# Patient Record
Sex: FEMALE | Race: WHITE
[De-identification: names, ages, dates, MRNs, and addresses within clinical notes are randomized per-mention and may not be internally consistent; named-entity substitution may affect disease eponyms.]

---

## 2019-09-12 ENCOUNTER — HOSPITAL ENCOUNTER (EMERGENCY)
Dept: HOSPITAL 76 - ED | Age: 24
Discharge: HOME | End: 2019-09-12
Payer: COMMERCIAL

## 2019-09-12 VITALS — SYSTOLIC BLOOD PRESSURE: 119 MMHG | DIASTOLIC BLOOD PRESSURE: 82 MMHG

## 2019-09-12 DIAGNOSIS — L02.31: Primary | ICD-10-CM

## 2019-09-12 PROCEDURE — 99282 EMERGENCY DEPT VISIT SF MDM: CPT

## 2019-09-12 PROCEDURE — 99283 EMERGENCY DEPT VISIT LOW MDM: CPT

## 2019-09-12 NOTE — ED PHYSICIAN DOCUMENTATION
PD HPI SKIN





- Stated complaint


Stated Complaint: FEM /LUMP ON SKIN/PX





- Chief complaint


Chief Complaint: Wound





- History obtained from


History obtained from: Patient





- History of Present Illness


Timing - onset: How many days ago (8)


Timing - details: Gradual onset


Location: Other (right buttock)


Quality / character: Painful, Swelling


Associated symptoms: No: Fever


Recently seen: Not recently seen





Review of Systems


Constitutional: denies: Fever, Chills, Sweats


Skin: reports: Lesions





PD PAST MEDICAL HISTORY





- Past Medical History


Past Medical History: Yes


Psych: Depression





- Past Surgical History


Past Surgical History: Yes


/GYN: Other





- Present Medications


Home Medications: 


                                Ambulatory Orders











 Medication  Instructions  Recorded  Confirmed


 


Doxycycline Hyclate [Vibramycin] 100 mg PO BID #19 capsule 09/12/19 


 


FLUoxetine [PROzac] 20 mg PO DAILY 09/12/19 09/12/19


 


Hydrocodone/Acetaminophen 1 - 2 each PO Q6HR PRN #14 tablet 09/12/19 





[Hydrocodone-Acetamin 5-325 mg]   














- Allergies


Allergies/Adverse Reactions: 


                                    Allergies











Allergy/AdvReac Type Severity Reaction Status Date / Time


 


No Known Drug Allergies Allergy   Verified 09/12/19 23:24














- Social History


Does the pt smoke?: No


Smoking Status: Never smoker





PD ED PE NORMAL





- Vitals


Vital signs reviewed: Yes





- General


General: Alert and oriented X 3, No acute distress, Well developed/nourished





PD ED PE EXPANDED





- Female 


Female  visual: 


                            __________________________














                            __________________________





 1 - swelling, tenderness (tender, erythematous swelling without discharge, 

fluctuance, or palpable margins to suggest abscess)








Results





- Vitals


Vitals: 


                               Vital Signs - 24 hr











  09/12/19





  23:18


 


Temperature 37.1 C


 


Heart Rate 93


 


Blood Pressure 119/82 H


 


O2 Saturation 99








                                     Oxygen











O2 Source                      Room air

















PD MEDICAL DECISION MAKING





- ED course


Complexity details: considered differential, d/w patient





Departure





- Departure


Disposition: 01 Home, Self Care


Clinical Impression: 


 Abscess





Condition: Good


Instructions:  ED Staph Infec Abx Tx Only


Follow-Up: 


Errol Rodriguez MD [Primary Care Provider] - Within 3 Days


Prescriptions: 


Hydrocodone/Acetaminophen [Hydrocodone-Acetamin 5-325 mg] 1 - 2 each PO Q6HR PRN

#14 tablet


 PRN Reason: Pain


Doxycycline Hyclate [Vibramycin] 100 mg PO BID #19 capsule


Discharge Date/Time: 09/12/19 23:58

## 2020-01-19 ENCOUNTER — HOSPITAL ENCOUNTER (EMERGENCY)
Dept: HOSPITAL 76 - ED | Age: 25
Discharge: HOME | End: 2020-01-19
Payer: COMMERCIAL

## 2020-01-19 VITALS — SYSTOLIC BLOOD PRESSURE: 130 MMHG | DIASTOLIC BLOOD PRESSURE: 64 MMHG

## 2020-01-19 DIAGNOSIS — N39.0: Primary | ICD-10-CM

## 2020-01-19 LAB
CLARITY UR REFRACT.AUTO: CLEAR
GLUCOSE UR QL STRIP.AUTO: NEGATIVE MG/DL
HCG UR QL: NEGATIVE
KETONES UR QL STRIP.AUTO: NEGATIVE MG/DL
NITRITE UR QL STRIP.AUTO: NEGATIVE
PH UR STRIP.AUTO: 7 PH (ref 5–7.5)
PROT UR STRIP.AUTO-MCNC: NEGATIVE MG/DL
RBC # UR STRIP.AUTO: NEGATIVE /UL
SP GR UR STRIP.AUTO: 1.01 (ref 1–1.03)
SP GR UR STRIP.AUTO: 1.01 (ref 1–1.03)
SQUAMOUS URNS QL MICRO: (no result)
UROBILINOGEN UR QL STRIP.AUTO: (no result) E.U./DL
UROBILINOGEN UR STRIP.AUTO-MCNC: NEGATIVE MG/DL

## 2020-01-19 PROCEDURE — 87086 URINE CULTURE/COLONY COUNT: CPT

## 2020-01-19 PROCEDURE — 81025 URINE PREGNANCY TEST: CPT

## 2020-01-19 PROCEDURE — 81001 URINALYSIS AUTO W/SCOPE: CPT

## 2020-01-19 PROCEDURE — 99283 EMERGENCY DEPT VISIT LOW MDM: CPT

## 2020-01-19 PROCEDURE — 81003 URINALYSIS AUTO W/O SCOPE: CPT

## 2020-01-19 NOTE — ED PHYSICIAN DOCUMENTATION
PD HPI FEMALE 





- Stated complaint


Stated Complaint: FEMALE 





- Chief complaint


Chief Complaint: UTI





- History obtained from


History obtained from: Patient





- History of Present Illness


Timing - onset: How many days ago (2-3)


Timing - details: Gradual onset


Associated symptoms: Back pain, Vaginal discharge, Dysuria, Urinary frequency, 

Hematuria.  No: Fever, Abdominal pain, Pelvic pain, Vaginal pain, Vaginal 

bleeding, Genital sore/lesion


Contributing factors: No: Pregnant


Recently seen: Not recently seen





- Additional information


Additional information: 





This is a 24-year-old woman who presents with complaints that she thought she 

had a yeast infection because she was itchy and had a vaginal discharge about 6 

days ago.  She was trying to get into see her primary care provider but could 

not get an appointment and then she started developing increased urination 

frequency and low back pain in the right side that is now radiating to the left 

side and "throbbing".  She seen a little bit of light pink in the urine which 

could be blood.  Her last menstrual period was a couple of weeks ago and she 

denies pregnancy stating that she has an IUD.  She denies concern for STDs such 

as gonorrhea or chlamydia and declines testing for those infections.  She is b

een a little bit nauseous but no vomiting.  Denies fever, cough or sore throat.





Review of Systems


Constitutional: denies: Fever


Throat: denies: Sore throat


Respiratory: denies: Cough


GI: reports: Nausea.  denies: Abdominal Pain, Vomiting


: reports: Dysuria, Frequency, Hematuria, Discharge, LMP (2 weeks ago).  

denies: Incontinent, Now pregnant EGA


Musculoskeletal: reports: Back pain





PD PAST MEDICAL HISTORY





- Past Medical History


Psych: Depression





- Past Surgical History


Past Surgical History: Yes


/GYN: Other





- Present Medications


Home Medications: 


                                Ambulatory Orders











 Medication  Instructions  Recorded  Confirmed


 


Doxycycline Hyclate [Vibramycin] 100 mg PO BID #19 capsule 09/12/19 


 


FLUoxetine [PROzac] 20 mg PO DAILY 09/12/19 09/12/19


 


Hydrocodone/Acetaminophen 1 - 2 each PO Q6HR PRN #14 tablet 09/12/19 





[Hydrocodone-Acetamin 5-325 mg]   


 


Nitrofurantoin Monohyd/M-Cryst 100 mg PO BID #20 capsule 01/19/20 





[Macrobid 100 mg Capsule]   














- Allergies


Allergies/Adverse Reactions: 


                                    Allergies











Allergy/AdvReac Type Severity Reaction Status Date / Time


 


No Known Drug Allergies Allergy   Verified 01/19/20 13:10














- Social History


Does the pt smoke?: No


Smoking Status: Never smoker





PD ED PE NORMAL





- Vitals


Vital signs reviewed: Yes





- General


General: Alert and oriented X 3, No acute distress, Well developed/nourished, 

Other (Obese 24-year-old.)





- HEENT


HEENT: Atraumatic, Other (No scleral icterus)





- Abdomen


Abdomen: Normal bowel sounds, Soft, Non tender





- Back


Back: No CVA TTP





- Derm


Derm: Normal color, Warm and dry, No rash





- Psych


Psych: Normal mood, Normal affect





Results





- Vitals


Vitals: 


                               Vital Signs - 24 hr











  01/19/20 01/19/20





  13:10 15:23


 


Temperature 36.9 C 


 


Heart Rate 86 73


 


Respiratory 17 18





Rate  


 


Blood Pressure 124/57 L 130/64


 


O2 Saturation 99 99








                                     Oxygen











O2 Source                      Room air

















- Labs


Labs: 


                                Laboratory Tests











  01/19/20 01/19/20





  13:26 13:26


 


Urine Color  YELLOW 


 


Urine Clarity  CLEAR 


 


Urine pH  7.0 


 


Ur Specific Gravity  1.015  1.015


 


Urine Protein  NEGATIVE 


 


Urine Glucose (UA)  NEGATIVE 


 


Urine Ketones  NEGATIVE 


 


Urine Occult Blood  NEGATIVE 


 


Urine Nitrite  NEGATIVE 


 


Urine Bilirubin  NEGATIVE 


 


Urine Urobilinogen  0.2 (NORMAL) 


 


Ur Leukocyte Esterase  MODERATE H 


 


Urine RBC  None Seen 


 


Urine WBC  11-25 H 


 


Ur Squamous Epith Cells  MOD Squamous H 


 


Urine Bacteria  Few 


 


Ur Microscopic Review  INDICATED 


 


Urine Culture Comments  NOT INDICATED 


 


Urine HCG, Qual   NEGATIVE














PD MEDICAL DECISION MAKING





- ED course


Complexity details: reviewed results, d/w patient


ED course: 





The urinalysis was contaminated with squamous epithelial cells but there were 

11-25 white blood cells per high-power field.  She was not pregnant.  Patient 

will be treated empirically with Macrobid twice daily for 10 days.  Encouraged 

to drink lots of water and take ibuprofen if needed for pain.  She should 

follow-up with her primary care provider to have the urine retested after 

finishing the antibiotics.  Return if she has worsening back pain, fever, 

vomiting or other problems arise.  She was offered gonorrhea and Chlamydia 

testing today which she has declined.





Departure





- Departure


Disposition: 01 Home, Self Care


Clinical Impression: 


 Urinary tract infection





Condition: Good


Instructions:  ED UTI Cystitis Female


Follow-Up: 


Errol Rodriguez MD [Primary Care Provider] - 


Prescriptions: 


Nitrofurantoin Monohyd/M-Cryst [Macrobid 100 mg Capsule] 100 mg PO BID #20 

capsule


Comments: 


Drink lots of water.  Take ibuprofen if needed for pain.  Start the antibiotic 

today and take it twice a day for 10 days.  Follow-up with your primary care 

provider to make sure that the infection has cleared and retest the urine after 

finishing the antibiotics.  Follow-up sooner if you have increasing back pain, 

vomiting, fever or other problems arise.


Discharge Date/Time: 01/19/20 15:23

## 2020-03-08 ENCOUNTER — HOSPITAL ENCOUNTER (EMERGENCY)
Dept: HOSPITAL 76 - ED | Age: 25
Discharge: HOME | End: 2020-03-08
Payer: COMMERCIAL

## 2020-03-08 VITALS — DIASTOLIC BLOOD PRESSURE: 67 MMHG | SYSTOLIC BLOOD PRESSURE: 133 MMHG

## 2020-03-08 DIAGNOSIS — G43.001: Primary | ICD-10-CM

## 2020-03-08 PROCEDURE — 96374 THER/PROPH/DIAG INJ IV PUSH: CPT

## 2020-03-08 PROCEDURE — 99284 EMERGENCY DEPT VISIT MOD MDM: CPT

## 2020-03-08 PROCEDURE — 96375 TX/PRO/DX INJ NEW DRUG ADDON: CPT

## 2020-03-08 PROCEDURE — 96361 HYDRATE IV INFUSION ADD-ON: CPT

## 2020-03-08 PROCEDURE — 99283 EMERGENCY DEPT VISIT LOW MDM: CPT

## 2020-11-21 ENCOUNTER — HOSPITAL ENCOUNTER (EMERGENCY)
Dept: HOSPITAL 76 - ED | Age: 25
Discharge: HOME | End: 2020-11-21
Payer: COMMERCIAL

## 2020-11-21 VITALS — DIASTOLIC BLOOD PRESSURE: 60 MMHG | SYSTOLIC BLOOD PRESSURE: 119 MMHG

## 2020-11-21 DIAGNOSIS — R10.11: Primary | ICD-10-CM

## 2020-11-21 LAB
ALBUMIN DIAFP-MCNC: 4.3 G/DL (ref 3.2–5.5)
ALBUMIN/GLOB SERPL: 1.3 {RATIO} (ref 1–2.2)
ALP SERPL-CCNC: 44 IU/L (ref 42–121)
ALT SERPL W P-5'-P-CCNC: 17 IU/L (ref 10–60)
ANION GAP SERPL CALCULATED.4IONS-SCNC: 8 MMOL/L (ref 6–13)
AST SERPL W P-5'-P-CCNC: 15 IU/L (ref 10–42)
BASOPHILS NFR BLD AUTO: 0 10^3/UL (ref 0–0.1)
BASOPHILS NFR BLD AUTO: 0.3 %
BILIRUB BLD-MCNC: 0.6 MG/DL (ref 0.2–1)
BUN SERPL-MCNC: 16 MG/DL (ref 6–20)
CALCIUM UR-MCNC: 9.5 MG/DL (ref 8.5–10.3)
CHLORIDE SERPL-SCNC: 104 MMOL/L (ref 101–111)
CLARITY UR REFRACT.AUTO: (no result)
CO2 SERPL-SCNC: 26 MMOL/L (ref 21–32)
CREAT SERPLBLD-SCNC: 0.8 MG/DL (ref 0.4–1)
EOSINOPHIL # BLD AUTO: 0.1 10^3/UL (ref 0–0.7)
EOSINOPHIL NFR BLD AUTO: 2.1 %
ERYTHROCYTE [DISTWIDTH] IN BLOOD BY AUTOMATED COUNT: 14.4 % (ref 12–15)
GLOBULIN SER-MCNC: 3.2 G/DL (ref 2.1–4.2)
GLUCOSE SERPL-MCNC: 97 MG/DL (ref 70–100)
GLUCOSE UR QL STRIP.AUTO: NEGATIVE MG/DL
HCG UR QL: NEGATIVE
HGB UR QL STRIP: 12.8 G/DL (ref 12–16)
KETONES UR QL STRIP.AUTO: 15 MG/DL
LIPASE SERPL-CCNC: 44 U/L (ref 22–51)
LYMPHOCYTES # SPEC AUTO: 2 10^3/UL (ref 1.5–3.5)
LYMPHOCYTES NFR BLD AUTO: 33.9 %
MCH RBC QN AUTO: 29.2 PG (ref 27–31)
MCHC RBC AUTO-ENTMCNC: 32.9 G/DL (ref 32–36)
MCV RBC AUTO: 88.6 FL (ref 81–99)
MONOCYTES # BLD AUTO: 0.4 10^3/UL (ref 0–1)
MONOCYTES NFR BLD AUTO: 6.9 %
NEUTROPHILS # BLD AUTO: 3.3 10^3/UL (ref 1.5–6.6)
NEUTROPHILS # SNV AUTO: 5.8 X10^3/UL (ref 4.8–10.8)
NEUTROPHILS NFR BLD AUTO: 56.6 %
NITRITE UR QL STRIP.AUTO: NEGATIVE
PDW BLD AUTO: 10.2 FL (ref 7.9–10.8)
PH UR STRIP.AUTO: 6.5 PH (ref 5–7.5)
PLATELET # BLD: 290 10^3/UL (ref 130–450)
PROT SPEC-MCNC: 7.5 G/DL (ref 6.7–8.2)
PROT UR STRIP.AUTO-MCNC: NEGATIVE MG/DL
RBC # UR STRIP.AUTO: NEGATIVE /UL
RBC MAR: 4.39 10^6/UL (ref 4.2–5.4)
SODIUM SERPLBLD-SCNC: 138 MMOL/L (ref 135–145)
SP GR UR STRIP.AUTO: 1.02 (ref 1–1.03)
SQUAMOUS URNS QL MICRO: (no result)
UROBILINOGEN UR QL STRIP.AUTO: (no result) E.U./DL
UROBILINOGEN UR STRIP.AUTO-MCNC: (no result) MG/DL

## 2020-11-21 PROCEDURE — 96376 TX/PRO/DX INJ SAME DRUG ADON: CPT

## 2020-11-21 PROCEDURE — 96374 THER/PROPH/DIAG INJ IV PUSH: CPT

## 2020-11-21 PROCEDURE — 36415 COLL VENOUS BLD VENIPUNCTURE: CPT

## 2020-11-21 PROCEDURE — 99285 EMERGENCY DEPT VISIT HI MDM: CPT

## 2020-11-21 PROCEDURE — 85025 COMPLETE CBC W/AUTO DIFF WBC: CPT

## 2020-11-21 PROCEDURE — 99284 EMERGENCY DEPT VISIT MOD MDM: CPT

## 2020-11-21 PROCEDURE — 81003 URINALYSIS AUTO W/O SCOPE: CPT

## 2020-11-21 PROCEDURE — 81025 URINE PREGNANCY TEST: CPT

## 2020-11-21 PROCEDURE — 83690 ASSAY OF LIPASE: CPT

## 2020-11-21 PROCEDURE — 76705 ECHO EXAM OF ABDOMEN: CPT

## 2020-11-21 PROCEDURE — 80053 COMPREHEN METABOLIC PANEL: CPT

## 2020-11-21 PROCEDURE — 96375 TX/PRO/DX INJ NEW DRUG ADDON: CPT

## 2020-11-21 PROCEDURE — 87086 URINE CULTURE/COLONY COUNT: CPT

## 2020-11-21 PROCEDURE — 74177 CT ABD & PELVIS W/CONTRAST: CPT

## 2020-11-21 PROCEDURE — 71275 CT ANGIOGRAPHY CHEST: CPT

## 2020-11-21 PROCEDURE — 81001 URINALYSIS AUTO W/SCOPE: CPT

## 2020-11-21 NOTE — CT REPORT
PROCEDURE:  ANGIO CHEST W/WO

 

INDICATIONS:  Right-sided chest pain

 

CONTRAST:  IV CONTRAST: Optiray 320 ml: 100 PO CONTRAST: *NO PO CONTRAST 

 

TECHNIQUE:  

After the administration of intravenous contrast, 2 mm thick sections acquired from the pulmonary api
letty to the posterior costophrenic angles.  3-dimensional maximum intensity projection (MIP) coronal a
nd sagittal reformats were then acquired through the thorax. For radiation dose reduction, the follow
ing was used:  automated exposure control, adjustment of mA and/or kV according to patient size. 

 

COMPARISON:  None

 

FINDINGS:  

Image quality:  Excellent.  

 

Pulmonary arteries:  Pulmonary arteries are normal in size, and demonstrate no intraluminal filling d
efects to suggest central pulmonary embolism.  

 

Lungs and pleura:  Lungs are clear.  No pleural effusions or pneumothorax.  Central and peripheral ai
rways are patent.  

 

Mediastinum:  Heart size is normal, without pericardial effusion.  No mediastinal or hilar adenopathy
.  Thoracic aorta is normal in caliber and enhancement.  Esophagus is normal in caliber, without hiat
al hernia.  

 

Bones and chest wall:  No suspicious bony lesions.  Ribs and thoracic spine appear intact throughout.
  The thyroid is normal.  No axillary or supraclavicular adenopathy.  

 

Abdomen:  Visualized upper abdominal solid organs appear normal in the early arterial phase of enhanc
ement.  

 

IMPRESSION:  

No acute finding.

 

Reviewed by: Ulises Cornelius MD on 11/21/2020 3:20 PM PST

Approved by: Ulises Cornelius MD on 11/21/2020 3:20 PM PST

 

 

Station ID:  [id number]

## 2020-11-21 NOTE — CT REPORT
PROCEDURE:  Abdomen/Pelvis W

 

INDICATIONS:  IV only, RUQ pain, neg sono

 

CONTRAST:  IV CONTRAST: Optiray 320 ml: 100 PO CONTRAST: *NO PO CONTRAST 

 

TECHNIQUE:  

After the administration of intravenous contrast, 5 mm thick sections acquired from the diaphragms to
 the symphysis.  5 mm thick coronal and sagittal reformats were acquired.  For radiation dose reducti
on, the following was used:  automated exposure control, adjustment of mA and/or kV according to jennyfer
ent size.  

 

COMPARISON:  None.

 

FINDINGS:  

Image quality:  Excellent.  

 

ABDOMEN:  

Lung bases:  Lung bases are clear.  Heart size is normal.  

 

Solid organs:  Liver and spleen are within normal limits. Gallbladder is normal.  Biliary system is n
on dilated.  Pancreas enhances normally.  No adrenal nodules.  Kidneys demonstrate normal size and en
hancement, without hydronephrosis.  

 

Peritoneum and bowel:  Bowel loops demonstrate normal wall thickness and caliber.  No free fluid or a
ir.  

 

Nodes and vessels:  No retroperitoneal or mesenteric adenopathy by size criteria.  Aorta and inferior
 vena cava are normal in size.  

 

Miscellaneous:  No ventral hernias.  

 

 

PELVIS:  

Genitourinary:  Bladder wall thickness is normal.  

 

Miscellaneous:  No inguinal hernias or adenopathy.  

 

Bones:  No suspicious bony lesions.  No vertebral body compression fractures.  

 

IMPRESSION:  No acute abnormality or finding to explain pain.

 

Reviewed by: Ulises Cornelius MD on 11/21/2020 3:22 PM PST

Approved by: Ulises Cornelius MD on 11/21/2020 3:22 PM PST

 

 

Station ID:  [id number]

## 2020-11-21 NOTE — ED PHYSICIAN DOCUMENTATION
PD HPI ABD PAIN





- Stated complaint


Stated Complaint: RT RIB PAIN





- Chief complaint


Chief Complaint: Abd Pain





- History obtained from


History obtained from: Patient





- Additional information


Additional information: 





About a weeks worth of generally worsening right upper quadrant pain that does 

not seem to change with eating, now with nausea.  No history of abdominal 

surgeries.  Pain radiates to the back a little bit.





Review of Systems


Ten Systems: 10 systems reviewed and negative


Constitutional: denies: Fever, Chills


Cardiac: denies: Chest pain / pressure, Palpitations


Respiratory: denies: Dyspnea, Cough


GI: reports: Abdominal Pain, Nausea.  denies: Constipation





PD PAST MEDICAL HISTORY





- Past Medical History


Cardiovascular: None


Respiratory: None


Neuro: Migraines


Endocrine/Autoimmune: None


GI: None


GYN: None


: None


HEENT: None


Psych: Depression


Musculoskeletal: None


Derm: None





- Past Surgical History


Past Surgical History: Yes


/GYN: Other





- Present Medications


Home Medications: 


                                Ambulatory Orders











 Medication  Instructions  Recorded  Confirmed


 


Doxycycline Hyclate [Vibramycin] 100 mg PO BID #19 capsule 09/12/19 


 


FLUoxetine [PROzac] 20 mg PO DAILY 09/12/19 09/12/19


 


Hydrocodone/Acetaminophen 1 - 2 each PO Q6HR PRN #14 tablet 09/12/19 





[Hydrocodone-Acetamin 5-325 mg]   


 


Nitrofurantoin Monohyd/M-Cryst 100 mg PO BID #20 capsule 01/19/20 





[Macrobid 100 mg Capsule]   


 


Butalb/Acetaminophen/Caffeine 1 each PO QID PRN #10 capsule 03/08/20 





[Fioricet -40 mg Capsule]   


 


Ondansetron Odt [Zofran] 4 mg TL Q6H PRN #10 tablet 11/21/20 


 


Oxycodone HCl/Acetaminophen 1 - 2 each PO Q6H PRN #14 tablet 11/21/20 





[Percocet 5-325 mg Tablet]   














- Allergies


Allergies/Adverse Reactions: 


                                    Allergies











Allergy/AdvReac Type Severity Reaction Status Date / Time


 


No Known Drug Allergies Allergy   Verified 11/21/20 11:52














- Social History


Does the pt smoke?: No


Smoking Status: Never smoker





- Immunizations


Immunizations are current?: Yes





- POLST


Patient has POLST: No





PD ED PE NORMAL





- Vitals


Vital signs reviewed: Yes





- General


General: Alert and oriented X 3, No acute distress





- HEENT


HEENT: PERRL, EOMI





- Neck


Neck: Supple, no meningeal sign, No bony TTP





- Cardiac


Cardiac: RRR, No murmur





- Respiratory


Respiratory: No respiratory distress, Clear bilaterally





- Abdomen


Abdomen: Soft, Other (TTP RUQ with pos Hensley)





- Back


Back: No CVA TTP, No spinal TTP





- Derm


Derm: Normal color, Warm and dry





- Extremities


Extremities: No edema, No calf tenderness / cord





- Neuro


Neuro: Alert and oriented X 3





Results





- Vitals


Vitals: 


                               Vital Signs - 24 hr











  11/21/20 11/21/20 11/21/20





  11:49 12:42 13:26


 


Temperature 37.0 C  


 


Heart Rate 72 72 55 L


 


Respiratory 18 16 15





Rate   


 


Blood Pressure 123/60 119/62 103/68


 


O2 Saturation 98 98 100














  11/21/20





  14:33


 


Temperature 


 


Heart Rate 54 L


 


Respiratory 16





Rate 


 


Blood Pressure 98/65


 


O2 Saturation 96








                                     Oxygen











O2 Source                      Room air

















- Labs


Labs: 


                                Laboratory Tests











  11/21/20 11/21/20 11/21/20





  12:04 12:20 12:20


 


WBC   5.8 


 


RBC   4.39 


 


Hgb   12.8 


 


Hct   38.9 


 


MCV   88.6 


 


MCH   29.2 


 


MCHC   32.9 


 


RDW   14.4 


 


Plt Count   290 


 


MPV   10.2 


 


Neut # (Auto)   3.3 


 


Lymph # (Auto)   2.0 


 


Mono # (Auto)   0.4 


 


Eos # (Auto)   0.1 


 


Baso # (Auto)   0.0 


 


Absolute Nucleated RBC   0.00 


 


Nucleated RBC %   0.0 


 


Sodium    138


 


Potassium    3.9


 


Chloride    104


 


Carbon Dioxide    26


 


Anion Gap    8.0


 


BUN    16


 


Creatinine    0.8


 


Estimated GFR (MDRD)    86 L


 


Glucose    97


 


Calcium    9.5


 


Total Bilirubin    0.6


 


AST    15


 


ALT    17


 


Alkaline Phosphatase    44


 


Total Protein    7.5


 


Albumin    4.3


 


Globulin    3.2


 


Albumin/Globulin Ratio    1.3


 


Lipase    44


 


Urine Color  DARK YELLOW  


 


Urine Clarity  SL. CLOUDY  


 


Urine pH  6.5  


 


Ur Specific Gravity  1.025  


 


Urine Protein  NEGATIVE  


 


Urine Glucose (UA)  NEGATIVE  


 


Urine Ketones  15 H  


 


Urine Occult Blood  NEGATIVE  


 


Urine Nitrite  NEGATIVE  


 


Urine Bilirubin  SMALL H  


 


Urine Urobilinogen  1 (NORMAL)  


 


Ur Leukocyte Esterase  NEGATIVE  


 


Urine RBC  None Seen  


 


Urine WBC  0-3  


 


Ur Squamous Epith Cells  MANY Squamous H  


 


Urine Bacteria  Rare  


 


Ur Microscopic Review  INDICATED  


 


Urine Culture Comments  NOT INDICATED  


 


Urine HCG, Qual  NEGATIVE  














- Rads (name of study)


  ** RUQ son


Radiology: EMP read contemporaneously (NAD)





PD MEDICAL DECISION MAKING





- ED course


ED course: 





24yo F what seemed like gallbladder pain on the face of it, that said her labs 

and ultrasound were normal so this was followed up with an extended differential

diagnosis including PE and other intra-abdominal abnormalities with a CTA of the

chest and abdomen CT which were also negative.





Departure





- Departure


Disposition: 01 Home, Self Care


Clinical Impression: 


Abdominal pain


Qualifiers:


 Abdominal location: right upper quadrant Qualified Code(s): R10.11 - Right 

upper quadrant pain





Condition: Good


Record reviewed to determine appropriate education?: Yes


Instructions:  ED Abdominal Pain Unkn Cause


Prescriptions: 


Oxycodone HCl/Acetaminophen [Percocet 5-325 mg Tablet] 1 - 2 each PO Q6H PRN #14

tablet


 PRN Reason: pain


Ondansetron Odt [Zofran] 4 mg TL Q6H PRN #10 tablet


 PRN Reason: Nausea / Vomiting


Comments: 


Return if pain does not improve quickly, Or for any new or worsening symptoms.

## 2020-11-21 NOTE — ULTRASOUND REPORT
PROCEDURE: Abdomen Limited

 

INDICATIONS:  RUQ pain

 

TECHNIQUE:  

Real-time focused scanning was performed of the abdomen, with image documentation.  

 

COMPARISON:  None.

 

FINDINGS:  Normal sonographic appearance of the liver, gallbladder, bile ducts, pancreas, spleen, and
 right kidney.

 

IMPRESSION:  

Normal right upper quadrant abdominal ultrasound. No findings of cholelithiasis or cholecystitis.

 

Reviewed by: Ulises Cornelius MD on 11/21/2020 1:58 PM PST

Approved by: Ulises Cornelius MD on 11/21/2020 1:58 PM PST

 

 

Station ID:  [id number]

## 2021-03-19 ENCOUNTER — HOSPITAL ENCOUNTER (EMERGENCY)
Dept: HOSPITAL 76 - ED | Age: 26
Discharge: HOME | End: 2021-03-19
Payer: COMMERCIAL

## 2021-03-19 VITALS — DIASTOLIC BLOOD PRESSURE: 76 MMHG | SYSTOLIC BLOOD PRESSURE: 112 MMHG

## 2021-03-19 DIAGNOSIS — R19.7: ICD-10-CM

## 2021-03-19 DIAGNOSIS — R55: ICD-10-CM

## 2021-03-19 DIAGNOSIS — E86.0: Primary | ICD-10-CM

## 2021-03-19 LAB
ALBUMIN DIAFP-MCNC: 4.1 G/DL (ref 3.2–5.5)
ALBUMIN/GLOB SERPL: 1.5 {RATIO} (ref 1–2.2)
ALP SERPL-CCNC: 43 IU/L (ref 42–121)
ALT SERPL W P-5'-P-CCNC: 20 IU/L (ref 10–60)
ANION GAP SERPL CALCULATED.4IONS-SCNC: 11 MMOL/L (ref 6–13)
AST SERPL W P-5'-P-CCNC: 18 IU/L (ref 10–42)
BASOPHILS NFR BLD AUTO: 0 10^3/UL (ref 0–0.1)
BASOPHILS NFR BLD AUTO: 0.4 %
BILIRUB BLD-MCNC: 0.3 MG/DL (ref 0.2–1)
BUN SERPL-MCNC: 15 MG/DL (ref 6–20)
CALCIUM UR-MCNC: 9.5 MG/DL (ref 8.5–10.3)
CHLORIDE SERPL-SCNC: 103 MMOL/L (ref 101–111)
CLARITY UR REFRACT.AUTO: (no result)
CO2 SERPL-SCNC: 22 MMOL/L (ref 21–32)
CREAT SERPLBLD-SCNC: 0.8 MG/DL (ref 0.4–1)
EOSINOPHIL # BLD AUTO: 0.1 10^3/UL (ref 0–0.7)
EOSINOPHIL NFR BLD AUTO: 0.8 %
ERYTHROCYTE [DISTWIDTH] IN BLOOD BY AUTOMATED COUNT: 13.8 % (ref 12–15)
GFRSERPLBLD MDRD-ARVRAT: 86 ML/MIN/{1.73_M2} (ref 89–?)
GLOBULIN SER-MCNC: 2.8 G/DL (ref 2.1–4.2)
GLUCOSE SERPL-MCNC: 89 MG/DL (ref 70–100)
GLUCOSE UR QL STRIP.AUTO: NEGATIVE MG/DL
HCG UR QL: NEGATIVE
HCT VFR BLD AUTO: 36.8 % (ref 37–47)
HGB UR QL STRIP: 12 G/DL (ref 12–16)
KETONES UR QL STRIP.AUTO: (no result) MG/DL
LIPASE SERPL-CCNC: 31 U/L (ref 22–51)
LYMPHOCYTES # SPEC AUTO: 1.6 10^3/UL (ref 1.5–3.5)
LYMPHOCYTES NFR BLD AUTO: 20.6 %
MCH RBC QN AUTO: 29.2 PG (ref 27–31)
MCHC RBC AUTO-ENTMCNC: 32.6 G/DL (ref 32–36)
MCV RBC AUTO: 89.5 FL (ref 81–99)
MONOCYTES # BLD AUTO: 0.5 10^3/UL (ref 0–1)
MONOCYTES NFR BLD AUTO: 5.9 %
NEUTROPHILS # BLD AUTO: 5.7 10^3/UL (ref 1.5–6.6)
NEUTROPHILS # SNV AUTO: 7.9 X10^3/UL (ref 4.8–10.8)
NEUTROPHILS NFR BLD AUTO: 72 %
NITRITE UR QL STRIP.AUTO: NEGATIVE
NRBC # BLD AUTO: 0 /100WBC
NRBC # BLD AUTO: 0 X10^3/UL
PDW BLD AUTO: 11 FL (ref 7.9–10.8)
PH UR STRIP.AUTO: 6 PH (ref 5–7.5)
PLATELET # BLD: 231 10^3/UL (ref 130–450)
POTASSIUM SERPL-SCNC: 3.8 MMOL/L (ref 3.5–5)
PROT SPEC-MCNC: 6.9 G/DL (ref 6.7–8.2)
PROT UR STRIP.AUTO-MCNC: NEGATIVE MG/DL
RBC # UR STRIP.AUTO: (no result) /UL
RBC # URNS HPF: (no result) /HPF (ref 0–5)
RBC MAR: 4.11 10^6/UL (ref 4.2–5.4)
SODIUM SERPLBLD-SCNC: 136 MMOL/L (ref 135–145)
SP GR UR STRIP.AUTO: 1.02 (ref 1–1.03)
SQUAMOUS URNS QL MICRO: (no result)
UROBILINOGEN UR QL STRIP.AUTO: (no result) E.U./DL
UROBILINOGEN UR STRIP.AUTO-MCNC: NEGATIVE MG/DL
WBC # UR MANUAL: (no result) /HPF (ref 0–5)

## 2021-03-19 PROCEDURE — 83690 ASSAY OF LIPASE: CPT

## 2021-03-19 PROCEDURE — 85025 COMPLETE CBC W/AUTO DIFF WBC: CPT

## 2021-03-19 PROCEDURE — 80053 COMPREHEN METABOLIC PANEL: CPT

## 2021-03-19 PROCEDURE — 99284 EMERGENCY DEPT VISIT MOD MDM: CPT

## 2021-03-19 PROCEDURE — 96365 THER/PROPH/DIAG IV INF INIT: CPT

## 2021-03-19 PROCEDURE — 96375 TX/PRO/DX INJ NEW DRUG ADDON: CPT

## 2021-03-19 PROCEDURE — 81025 URINE PREGNANCY TEST: CPT

## 2021-03-19 PROCEDURE — 81001 URINALYSIS AUTO W/SCOPE: CPT

## 2021-03-19 PROCEDURE — 36415 COLL VENOUS BLD VENIPUNCTURE: CPT

## 2021-03-19 PROCEDURE — 81003 URINALYSIS AUTO W/O SCOPE: CPT

## 2021-03-19 PROCEDURE — 93005 ELECTROCARDIOGRAM TRACING: CPT

## 2021-03-19 PROCEDURE — 87086 URINE CULTURE/COLONY COUNT: CPT

## 2021-03-19 PROCEDURE — 96361 HYDRATE IV INFUSION ADD-ON: CPT

## 2021-03-19 NOTE — ED PHYSICIAN DOCUMENTATION
History of Present Illness





- Stated complaint


Stated Complaint: DIARRHEA,PASSED OUT





- Chief complaint


Chief Complaint: Abd Pain





- History obtained from


History obtained from: Patient





- History of Present Illness


Timing: Today


Pain level max: 5


Pain level now: 5





- Additonal information


Additional information: 


25-year-old female states she has had diarrhea today.  She states no vomiting 

occasional nausea.  While she was having diarrhea she had abdominal cramping and

thinks that she may have passed out on the toilet.  Did not strike her head.  No

neck or back pain.  Denies any possibility of pregnancy.  No fevers.  No chills.

 No recent travel.  No recent antibiotics.  Nothing makes it better or worse





Review of Systems


Constitutional: denies: Fever, Chills


Nose: denies: Rhinorrhea / runny nose, Congestion


Cardiac: denies: Chest pain / pressure


Respiratory: denies: Cough


GI: reports: Diarrhea.  denies: Vomiting


Skin: denies: Rash


Musculoskeletal: denies: Neck pain, Back pain


Neurologic: denies: Headache





PD PAST MEDICAL HISTORY





- Past Medical History


Past Medical History: Yes


Cardiovascular: None


Respiratory: None


Neuro: Migraines


Endocrine/Autoimmune: None


GI: None


GYN: None


: None


HEENT: None


Psych: Depression


Musculoskeletal: None


Derm: None





- Past Surgical History


Past Surgical History: Yes


/GYN: Other





- Present Medications


Home Medications: 


                                Ambulatory Orders











 Medication  Instructions  Recorded  Confirmed


 


Doxycycline Hyclate [Vibramycin] 100 mg PO BID #19 capsule 09/12/19 


 


FLUoxetine [PROzac] 20 mg PO DAILY 09/12/19 09/12/19


 


Hydrocodone/Acetaminophen 1 - 2 each PO Q6HR PRN #14 tablet 09/12/19 





[Hydrocodone-Acetamin 5-325 mg]   


 


Nitrofurantoin Monohyd/M-Cryst 100 mg PO BID #20 capsule 01/19/20 





[Macrobid 100 mg Capsule]   


 


Butalb/Acetaminophen/Caffeine 1 each PO QID PRN #10 capsule 03/08/20 





[Fioricet -40 mg Capsule]   


 


Ondansetron Odt [Zofran] 4 mg TL Q6H PRN #10 tablet 11/21/20 


 


Oxycodone HCl/Acetaminophen 1 - 2 each PO Q6H PRN #14 tablet 11/21/20 





[Percocet 5-325 mg Tablet]   


 


Hyoscyamine Sulfate [Levsin-Sl] 0.125 mg SL Q6H PRN #10 03/19/21 


 


Ondansetron Odt [Zofran] 4 mg TL Q6H PRN #10 tablet 03/19/21 














- Allergies


Allergies/Adverse Reactions: 


                                    Allergies











Allergy/AdvReac Type Severity Reaction Status Date / Time


 


No Known Drug Allergies Allergy   Verified 03/19/21 19:01














- Social History


Does the pt smoke?: No


Smoking Status: Never smoker


Does the pt drink ETOH?: No


Does the pt have substance abuse?: No





- Immunizations


Immunizations are current?: Yes





- POLST


Patient has POLST: No





PD ED PE NORMAL





- Vitals


Vital signs reviewed: Yes





- General


General: Alert and oriented X 3, No acute distress





- HEENT


HEENT: Moist mucous membranes





- Neck


Neck: Supple, no meningeal sign





- Cardiac


Cardiac: RRR





- Respiratory


Respiratory: No respiratory distress, Clear bilaterally





- Abdomen


Abdomen: Soft, Non tender, Non distended





- Derm


Derm: Warm and dry





- Neuro


Neuro: Alert and oriented X 3





- Psych


Psych: Normal mood, Normal affect





Results





- Vitals


Vitals: 


                               Vital Signs - 24 hr











  03/19/21 03/19/21 03/19/21





  19:02 19:06 21:01


 


Temperature 37.4 C  37.2 C


 


Heart Rate 69 68 53 L


 


Respiratory 18 21 18





Rate   


 


Blood Pressure 116/64 111/67 112/76


 


O2 Saturation 100 98 100








                                     Oxygen











O2 Source                      Room air

















- EKG (time done)


  ** 1934


Rate: Rate (enter#) (56)


Rhythm: NSR


Axis: Normal


Intervals: Normal [place]


QRS: Normal


Ischemia: Normal ST segments





- Labs


Labs: 


                                Laboratory Tests











  03/19/21 03/19/21 03/19/21





  19:19 20:00 20:00


 


WBC   7.9 


 


RBC   4.11 L 


 


Hgb   12.0 


 


Hct   36.8 L 


 


MCV   89.5 


 


MCH   29.2 


 


MCHC   32.6 


 


RDW   13.8 


 


Plt Count   231 


 


MPV   11.0 H 


 


Neut # (Auto)   5.7 


 


Lymph # (Auto)   1.6 


 


Mono # (Auto)   0.5 


 


Eos # (Auto)   0.1 


 


Baso # (Auto)   0.0 


 


Absolute Nucleated RBC   0.00 


 


Nucleated RBC %   0.0 


 


Sodium    136


 


Potassium    3.8


 


Chloride    103


 


Carbon Dioxide    22


 


Anion Gap    11.0


 


BUN    15


 


Creatinine    0.8


 


Estimated GFR (MDRD)    86 L


 


Glucose    89


 


Calcium    9.5


 


Total Bilirubin    0.3


 


AST    18


 


ALT    20


 


Alkaline Phosphatase    43


 


Total Protein    6.9


 


Albumin    4.1


 


Globulin    2.8


 


Albumin/Globulin Ratio    1.5


 


Lipase    31


 


Urine Color  YELLOW  


 


Urine Clarity  HAZY  


 


Urine pH  6.0  


 


Ur Specific Gravity  1.025  


 


Urine Protein  NEGATIVE  


 


Urine Glucose (UA)  NEGATIVE  


 


Urine Ketones  TRACE  


 


Urine Occult Blood  TRACE-INTA  


 


Urine Nitrite  NEGATIVE  


 


Urine Bilirubin  NEGATIVE  


 


Urine Urobilinogen  0.2 (NORMAL)  


 


Ur Leukocyte Esterase  TRACE H  


 


Urine RBC  0-5  


 


Urine WBC  4-5  


 


Ur Squamous Epith Cells  MOD Squamous H  


 


Urine Bacteria  Rare  


 


Ur Microscopic Review  INDICATED  


 


Urine Culture Comments  NOT INDICATED  


 


Urine HCG, Qual  NEGATIVE  














PD MEDICAL DECISION MAKING





- ED course


Complexity details: reviewed results, re-evaluated patient, considered 

differential, d/w patient


ED course: 





Patient given IV fluids, IV Ofirmev, Zofran.  Symptoms resolved.  She feels much

 better.  No significant lab abnormalities.  No chest pain.  No evidence of PE. 

 Appears to be a viral illness.  Abdomen is soft, nontender nondistended on 

serial exam.  Patient counseled regarding signs and symptoms for which I believe

 and urgent re-evaluation would be necessary. Patient with good understanding of

 and agreement to plan and is comfortable going home at this time





This document was made in part using voice recognition software. While efforts 

are made to proofread this document, sound alike and grammatical errors may 

occur.





Departure





- Departure


Disposition: 01 Home, Self Care


Clinical Impression: 


 Dehydration, Vasovagal syncope





Diarrhea


Qualifiers:


 Diarrhea type: unspecified type Qualified Code(s): R19.7 - Diarrhea, 

unspecified





Condition: Good


Instructions:  ED Dehydration, ED Diet Vomiting Diarrhea, ED Syncope Vasovagal


Follow-Up: 


ANIBAL MCKEON ARNP [Primary Care Provider] - Within 1 week


Prescriptions: 


Hyoscyamine Sulfate [Levsin-Sl] 0.125 mg SL Q6H PRN #10


 PRN Reason: Abdominal Pain


Ondansetron Odt [Zofran] 4 mg TL Q6H PRN #10 tablet


 PRN Reason: Nausea / Vomiting


Comments: 


Drink plenty of fluids.  Return if you worsen.  This should improve over the 

next 24 to 48 hours.


Discharge Date/Time: 03/19/21 21:54

## 2022-01-13 ENCOUNTER — HOSPITAL ENCOUNTER (EMERGENCY)
Dept: HOSPITAL 76 - ED | Age: 27
Discharge: HOME | End: 2022-01-13
Payer: COMMERCIAL

## 2022-01-13 VITALS — SYSTOLIC BLOOD PRESSURE: 117 MMHG | DIASTOLIC BLOOD PRESSURE: 81 MMHG

## 2022-01-13 DIAGNOSIS — R07.89: Primary | ICD-10-CM

## 2022-01-13 DIAGNOSIS — M79.602: ICD-10-CM

## 2022-01-13 LAB
ALBUMIN DIAFP-MCNC: 4.1 G/DL (ref 3.2–5.5)
ALBUMIN/GLOB SERPL: 1.3 {RATIO} (ref 1–2.2)
ALP SERPL-CCNC: 36 IU/L (ref 42–121)
ALT SERPL W P-5'-P-CCNC: 17 IU/L (ref 10–60)
ANION GAP SERPL CALCULATED.4IONS-SCNC: 9 MMOL/L (ref 6–13)
AST SERPL W P-5'-P-CCNC: 15 IU/L (ref 10–42)
B-HCG SERPL QL: NEGATIVE
BASOPHILS NFR BLD AUTO: 0 10^3/UL (ref 0–0.1)
BASOPHILS NFR BLD AUTO: 0.4 %
BILIRUB BLD-MCNC: 0.7 MG/DL (ref 0.2–1)
BUN SERPL-MCNC: 13 MG/DL (ref 6–20)
CALCIUM UR-MCNC: 9.4 MG/DL (ref 8.5–10.3)
CHLORIDE SERPL-SCNC: 100 MMOL/L (ref 101–111)
CO2 SERPL-SCNC: 25 MMOL/L (ref 21–32)
CREAT SERPLBLD-SCNC: 0.6 MG/DL (ref 0.4–1)
EOSINOPHIL # BLD AUTO: 0.1 10^3/UL (ref 0–0.7)
EOSINOPHIL NFR BLD AUTO: 0.9 %
ERYTHROCYTE [DISTWIDTH] IN BLOOD BY AUTOMATED COUNT: 14 % (ref 12–15)
GFRSERPLBLD MDRD-ARVRAT: 119 ML/MIN/{1.73_M2} (ref 89–?)
GLOBULIN SER-MCNC: 3.1 G/DL (ref 2.1–4.2)
GLUCOSE SERPL-MCNC: 84 MG/DL (ref 70–100)
HCT VFR BLD AUTO: 34.6 % (ref 37–47)
HGB UR QL STRIP: 11.5 G/DL (ref 12–16)
LIPASE SERPL-CCNC: 53 U/L (ref 22–51)
LYMPHOCYTES # SPEC AUTO: 3.1 10^3/UL (ref 1.5–3.5)
LYMPHOCYTES NFR BLD AUTO: 44.7 %
MCH RBC QN AUTO: 28.8 PG (ref 27–31)
MCHC RBC AUTO-ENTMCNC: 33.2 G/DL (ref 32–36)
MCV RBC AUTO: 86.7 FL (ref 81–99)
MONOCYTES # BLD AUTO: 0.5 10^3/UL (ref 0–1)
MONOCYTES NFR BLD AUTO: 7.1 %
NEUTROPHILS # BLD AUTO: 3.2 10^3/UL (ref 1.5–6.6)
NEUTROPHILS # SNV AUTO: 6.9 X10^3/UL (ref 4.8–10.8)
NEUTROPHILS NFR BLD AUTO: 46.6 %
NRBC # BLD AUTO: 0 /100WBC
NRBC # BLD AUTO: 0 X10^3/UL
PDW BLD AUTO: 10.2 FL (ref 7.9–10.8)
PLATELET # BLD: 223 10^3/UL (ref 130–450)
POTASSIUM SERPL-SCNC: 3.5 MMOL/L (ref 3.5–5)
PROT SPEC-MCNC: 7.2 G/DL (ref 6.7–8.2)
RBC MAR: 3.99 10^6/UL (ref 4.2–5.4)
SODIUM SERPLBLD-SCNC: 134 MMOL/L (ref 135–145)

## 2022-01-13 PROCEDURE — 36415 COLL VENOUS BLD VENIPUNCTURE: CPT

## 2022-01-13 PROCEDURE — 85025 COMPLETE CBC W/AUTO DIFF WBC: CPT

## 2022-01-13 PROCEDURE — 80053 COMPREHEN METABOLIC PANEL: CPT

## 2022-01-13 PROCEDURE — 93005 ELECTROCARDIOGRAM TRACING: CPT

## 2022-01-13 PROCEDURE — 84703 CHORIONIC GONADOTROPIN ASSAY: CPT

## 2022-01-13 PROCEDURE — 99284 EMERGENCY DEPT VISIT MOD MDM: CPT

## 2022-01-13 PROCEDURE — 85379 FIBRIN DEGRADATION QUANT: CPT

## 2022-01-13 PROCEDURE — 83690 ASSAY OF LIPASE: CPT

## 2022-01-13 PROCEDURE — 84484 ASSAY OF TROPONIN QUANT: CPT

## 2022-01-13 PROCEDURE — 96372 THER/PROPH/DIAG INJ SC/IM: CPT

## 2022-01-13 NOTE — XRAY REPORT
PROCEDURE:  Chest 1 View X-Ray

 

INDICATIONS:  chest pain

 

TECHNIQUE:  One view of the chest was acquired.  

 

COMPARISON:  CT angiogram chest dated 11/21/2020.

 

FINDINGS:  

 

Surgical changes and devices:  None.  

 

Lungs and pleura:  No pleural effusions or pneumothorax.  Lungs are clear.  

 

Mediastinum:  Mediastinal contours appear normal.  Heart size is normal.  

 

Bones and chest wall:  No suspicious bony lesions.  Overlying soft tissues appear unremarkable.  

 

IMPRESSION:  

Chest without acute cardiopulmonary abnormalities. No focal airspace disease.

 

Reviewed by: Pratik Rodriguez MD on 1/13/2022 8:21 PM PST

Approved by: Pratik Rodriguez MD on 1/13/2022 8:21 PM PST

 

 

Station ID:  [id number]

## 2022-01-13 NOTE — ED PHYSICIAN DOCUMENTATION
History of Present Illness





- Stated complaint


Stated Complaint: CHEST PAINS





- Chief complaint


Chief Complaint: Resp





- Additonal information


Additional information: 





26-year-old female presents emergency department for evaluate of acute pleuritic

left-sided chest pain with radiation to her left arm.  She describes a tingling 

sensation.  Pain is worse when she takes a deep breath.  No recent cough or 

fevers, no nausea or vomiting.  No previous history of pulmonary or cardiac 

disorders. Pain began about 15 to 20 minutes prior to arrival.





Patient has the copper ParaGard in place.  She is fully vaccinated for COVID-19 

but her  is COVID-positive at home.  She has no COVID symptoms.





Review of Systems


Constitutional: denies: Fever


Eyes: reports: Reviewed and negative


Nose: reports: Reviewed and negative


Throat: reports: Reviewed and negative


Cardiac: reports: Chest pain / pressure.  denies: Palpitations, Pedal edema, 

Calf pain


Respiratory: reports: Reviewed and negative


GI: reports: Reviewed and negative


: reports: Reviewed and negative


Skin: reports: Reviewed and negative


Musculoskeletal: reports: Reviewed and negative


Neurologic: reports: Reviewed and negative





PD PAST MEDICAL HISTORY





- Past Medical History


Cardiovascular: None


Respiratory: None


Neuro: Migraines


Endocrine/Autoimmune: None


GI: None


GYN: None


: None


HEENT: None


Psych: Depression


Musculoskeletal: None


Derm: None





- Past Surgical History


Past Surgical History: Yes


/GYN: Other





- Present Medications


Home Medications: 


                                Ambulatory Orders











 Medication  Instructions  Recorded  Confirmed


 


Doxycycline Hyclate [Vibramycin] 100 mg PO BID #19 capsule 09/12/19 


 


FLUoxetine [PROzac] 20 mg PO DAILY 09/12/19 09/12/19


 


Hydrocodone/Acetaminophen 1 - 2 each PO Q6HR PRN #14 tablet 09/12/19 





[Hydrocodone-Acetamin 5-325 mg]   


 


Nitrofurantoin Monohyd/M-Cryst 100 mg PO BID #20 capsule 01/19/20 





[Macrobid 100 mg Capsule]   


 


Butalb/Acetaminophen/Caffeine 1 each PO QID PRN #10 capsule 03/08/20 





[Fioricet -40 mg Capsule]   


 


Ondansetron Odt [Zofran] 4 mg TL Q6H PRN #10 tablet 11/21/20 


 


Oxycodone HCl/Acetaminophen 1 - 2 each PO Q6H PRN #14 tablet 11/21/20 





[Percocet 5-325 mg Tablet]   


 


Hyoscyamine Sulfate [Levsin-Sl] 0.125 mg SL Q6H PRN #10 03/19/21 


 


Ondansetron Odt [Zofran] 4 mg TL Q6H PRN #10 tablet 03/19/21 














- Allergies


Allergies/Adverse Reactions: 


                                    Allergies











Allergy/AdvReac Type Severity Reaction Status Date / Time


 


No Known Drug Allergies Allergy   Verified 03/19/21 19:01














- Social History


Does the pt smoke?: No


Smoking Status: Never smoker


Does the pt drink ETOH?: No


Does the pt have substance abuse?: No





- Immunizations


Immunizations are current?: Yes





- POLST


Patient has POLST: No





PD ED PE NORMAL





- General


General: Alert and oriented X 3, No acute distress





- HEENT


HEENT: PERRL





- Neck


Neck: Supple, no meningeal sign, No adenopathy





- Cardiac


Cardiac: RRR, No murmur, No gallop, Other (Chest pain is not reproducible with 

palpation.)





- Respiratory


Respiratory: Clear bilaterally





- Abdomen


Abdomen: Normal bowel sounds, Soft, Non tender, Non distended





- Back


Back: No CVA TTP, No spinal TTP





- Derm


Derm: Normal color, Warm and dry, No rash





- Extremities


Extremities: No deformity, No tenderness to palpate, Normal ROM s pain





Results





- Vitals


Vitals: 


                               Vital Signs - 24 hr











  01/13/22 01/13/22 01/13/22





  19:31 20:34 21:37


 


Temperature 37.2 C  


 


Heart Rate 63 64 57 L


 


Respiratory 15 17 17





Rate   


 


Blood Pressure 134/82 H 122/72 117/81 H


 


O2 Saturation 98 100 100








                                     Oxygen











O2 Source                      room

















- EKG (time done)


  ** 1945


Rate: Rate (enter#) (62)


Rhythm: NSR


Axis: Normal


Intervals: Normal [place]


QRS: Normal


Ischemia: Normal ST segments


Compare to prior EKG: Old EKG unavailable


Computer interpretation: Agree with computer





- Labs


Labs: 


                                Laboratory Tests











  01/13/22 01/13/22 01/13/22





  20:08 20:08 20:08


 


WBC  6.9  


 


RBC  3.99 L  


 


Hgb  11.5 L  


 


Hct  34.6 L  


 


MCV  86.7  


 


MCH  28.8  


 


MCHC  33.2  


 


RDW  14.0  


 


Plt Count  223  


 


MPV  10.2  


 


Neut # (Auto)  3.2  


 


Lymph # (Auto)  3.1  


 


Mono # (Auto)  0.5  


 


Eos # (Auto)  0.1  


 


Baso # (Auto)  0.0  


 


Absolute Nucleated RBC  0.00  


 


Nucleated RBC %  0.0  


 


D-Dimer   


 


Sodium   134 L 


 


Potassium   3.5 


 


Chloride   100 L 


 


Carbon Dioxide   25 


 


Anion Gap   9.0 


 


BUN   13 


 


Creatinine   0.6 


 


Estimated GFR (MDRD)   119 


 


Glucose   84 


 


Calcium   9.4 


 


Total Bilirubin   0.7 


 


AST   15 


 


ALT   17 


 


Alkaline Phosphatase   36 L 


 


Troponin I High Sens    2.5


 


Total Protein   7.2 


 


Albumin   4.1 


 


Globulin   3.1 


 


Albumin/Globulin Ratio   1.3 


 


Lipase   53 H 


 


Serum HCG, Qual   














  01/13/22 01/13/22 01/13/22





  20:08 20:08 21:07


 


WBC   


 


RBC   


 


Hgb   


 


Hct   


 


MCV   


 


MCH   


 


MCHC   


 


RDW   


 


Plt Count   


 


MPV   


 


Neut # (Auto)   


 


Lymph # (Auto)   


 


Mono # (Auto)   


 


Eos # (Auto)   


 


Baso # (Auto)   


 


Absolute Nucleated RBC   


 


Nucleated RBC %   


 


D-Dimer  < 200.0 L  


 


Sodium   


 


Potassium   


 


Chloride   


 


Carbon Dioxide   


 


Anion Gap   


 


BUN   


 


Creatinine   


 


Estimated GFR (MDRD)   


 


Glucose   


 


Calcium   


 


Total Bilirubin   


 


AST   


 


ALT   


 


Alkaline Phosphatase   


 


Troponin I High Sens    2.5


 


Total Protein   


 


Albumin   


 


Globulin   


 


Albumin/Globulin Ratio   


 


Lipase   


 


Serum HCG, Qual   NEGATIVE 














PD MEDICAL DECISION MAKING





- ED course


Complexity details: reviewed results, re-evaluated patient, considered 

differential, d/w patient


ED course: 


26-year-old female presented to the emergency department for evaluation of acute

 onset pleuritic left-sided chest pain with radiation to her arm.  It occurred 

about 15 to 30 minutes prior to arrival.  No history of similar in the past.  No

 history of hypertension or diabetes.  She is fully vaccinated for COVID as is 

her  who has COVID at home right now.





Screening EKG and chest x-ray were unremarkable.  Troponin was also negative.  

Given the pleuritic nature of her chest pain a D-dimer was completed and was 

also negative. very low probability of acs or PE.





I did offer the patient COVID screening but she is declining that at this time. 

 We discussed that in the absence of acute findings on x-ray or laboratory 

imaging that the most likely cause of her pain was simply a pleurisy.  She felt 

markedly improved after administration of Toradol and I have advised her to use 

ibuprofen at home for any further discomfort.  Emergent return precautions were 

discussed for worsening symptoms.








Departure





- Departure


Disposition: 01 Home, Self Care


Clinical Impression: 


Chest pain


Qualifiers:


 Chest pain type: unspecified Qualified Code(s): R07.9 - Chest pain, unspecified





Condition: Stable


Record reviewed to determine appropriate education?: Yes


Instructions:  ED Chest Pain Pleurisy


Comments: 


You were seen in the emergency department for sudden onset pleuritic chest pain.

  This pain does radiate to your arm and is worse when you take a deep breath.





Your screening chest x-ray, labs, EKG are all essentially normal.  We did do a 

test called a D-dimer to evaluate your risk of forming clots abnormally and this

 was normal.  With this and your history we can say with nearly 99% certainty 

that the cause of the pain in your chest is not a blood clot.  Your troponin and

 EKG are normal and given your age this is not secondary to myocardial ischemia.





The chest x-ray does not show any pneumonia or punctured lung.  It is possible 

that you simply have a condition called pleurisy which is a mild inflammation of

 the lining of your lung.  This can make breathing difficult.  In general the 

recommendation is that you take ibuprofen 600 mg with food 2-3 times a day most 

of the time this condition begins to resolve over 4 to 5 days.





If at any point you find that your symptoms are worsening, you have fainting 

episodes, you cannot catch your breath, Or you have severe respiratory distress 

then please return immediately to the ER for a second evaluation.